# Patient Record
Sex: MALE | Race: OTHER | HISPANIC OR LATINO | URBAN - NONMETROPOLITAN AREA
[De-identification: names, ages, dates, MRNs, and addresses within clinical notes are randomized per-mention and may not be internally consistent; named-entity substitution may affect disease eponyms.]

---

## 2023-07-24 LAB
BASOPHILS (10*3/UL) IN BLOOD BY AUTOMATED COUNT: 0.03 X10E9/L (ref 0–0.1)
BASOPHILS/100 LEUKOCYTES IN BLOOD BY AUTOMATED COUNT: 0.6 % (ref 0–2)
C REACTIVE PROTEIN (MG/L) IN SER/PLAS: <0.1 MG/DL
EOSINOPHILS (10*3/UL) IN BLOOD BY AUTOMATED COUNT: 0.04 X10E9/L (ref 0–0.7)
EOSINOPHILS/100 LEUKOCYTES IN BLOOD BY AUTOMATED COUNT: 0.8 % (ref 0–6)
ERYTHROCYTE DISTRIBUTION WIDTH (RATIO) BY AUTOMATED COUNT: 12.1 % (ref 11.5–14.5)
ERYTHROCYTE MEAN CORPUSCULAR HEMOGLOBIN CONCENTRATION (G/DL) BY AUTOMATED: 33.4 G/DL (ref 32–36)
ERYTHROCYTE MEAN CORPUSCULAR VOLUME (FL) BY AUTOMATED COUNT: 91 FL (ref 80–100)
ERYTHROCYTES (10*6/UL) IN BLOOD BY AUTOMATED COUNT: 4.27 X10E12/L (ref 4.5–5.9)
HEMATOCRIT (%) IN BLOOD BY AUTOMATED COUNT: 38.9 % (ref 41–52)
HEMOGLOBIN (G/DL) IN BLOOD: 13 G/DL (ref 13.5–17.5)
IMMATURE GRANULOCYTES/100 LEUKOCYTES IN BLOOD BY AUTOMATED COUNT: 0.4 % (ref 0–0.9)
LEUKOCYTES (10*3/UL) IN BLOOD BY AUTOMATED COUNT: 5.2 X10E9/L (ref 4.4–11.3)
LYMPHOCYTES (10*3/UL) IN BLOOD BY AUTOMATED COUNT: 2.73 X10E9/L (ref 1.2–4.8)
LYMPHOCYTES/100 LEUKOCYTES IN BLOOD BY AUTOMATED COUNT: 52.3 % (ref 13–44)
MONOCYTES (10*3/UL) IN BLOOD BY AUTOMATED COUNT: 0.48 X10E9/L (ref 0.1–1)
MONOCYTES/100 LEUKOCYTES IN BLOOD BY AUTOMATED COUNT: 9.2 % (ref 2–10)
NEUTROPHILS (10*3/UL) IN BLOOD BY AUTOMATED COUNT: 1.92 X10E9/L (ref 1.2–7.7)
NEUTROPHILS/100 LEUKOCYTES IN BLOOD BY AUTOMATED COUNT: 36.7 % (ref 40–80)
PLATELETS (10*3/UL) IN BLOOD AUTOMATED COUNT: 281 X10E9/L (ref 150–450)
SEDIMENTATION RATE, ERYTHROCYTE: 2 MM/H (ref 0–15)

## 2023-08-30 ENCOUNTER — OFFICE VISIT (OUTPATIENT)
Dept: PRIMARY CARE | Facility: CLINIC | Age: 23
End: 2023-08-30
Payer: COMMERCIAL

## 2023-08-30 VITALS
HEIGHT: 70 IN | HEART RATE: 76 BPM | DIASTOLIC BLOOD PRESSURE: 62 MMHG | BODY MASS INDEX: 16.72 KG/M2 | SYSTOLIC BLOOD PRESSURE: 98 MMHG | WEIGHT: 116.8 LBS

## 2023-08-30 DIAGNOSIS — M87.00 AVN (AVASCULAR NECROSIS OF BONE) (MULTI): Primary | ICD-10-CM

## 2023-08-30 PROCEDURE — 99214 OFFICE O/P EST MOD 30 MIN: CPT | Performed by: FAMILY MEDICINE

## 2023-08-30 PROCEDURE — 1036F TOBACCO NON-USER: CPT | Performed by: FAMILY MEDICINE

## 2023-08-30 NOTE — PROGRESS NOTES
Subjective   Patient ID: Jorge Rainey is a 22 y.o. male who presents for Establishing primary care and also to get pre op clearance for right hip arthroplasty    HPI     Review of Systems    Objective   There were no vitals taken for this visit.    Physical Exam    Assessment/Plan

## 2023-08-30 NOTE — PROGRESS NOTES
Subjective   Jorge Rainey is a 22 y.o. male who presents for No chief complaint on file..  Here to get established - he will be having surgery on his right hip - has AVN.  He has had surgery in the past - femur fracture in 2021 and then surgery to remove hardware.  He has not had any issues with surgery in the past.  He is not on any medications at this time.  Other than his hip he is feeling good, no concerns, no chest pain, no shortness of breath, no nausea/vomiting.      We reviewed his recent labs - he does have a mild anemia, otherwise labs look ok.  On further review of his records it appears that he was in the ER recently with GI symptoms - likely due to NSAID use, he is no longer taking any medications at this time.              Objective   Visit Vitals  BP 98/62 (BP Location: Left arm, Patient Position: Sitting, BP Cuff Size: Adult)   Pulse 76      Physical Exam  Vitals reviewed.   Constitutional:       General: He is not in acute distress.  Cardiovascular:      Rate and Rhythm: Normal rate and regular rhythm.      Heart sounds: No murmur heard.  Pulmonary:      Effort: Pulmonary effort is normal. No respiratory distress.      Breath sounds: Normal breath sounds.   Skin:     General: Skin is warm and dry.   Neurological:      General: No focal deficit present.      Mental Status: He is alert. Mental status is at baseline.         Assessment/Plan   Problem List Items Addressed This Visit    None         Lian Gonzalez MD

## 2023-09-11 PROBLEM — M16.11 PRIMARY OSTEOARTHRITIS OF RIGHT HIP: Status: ACTIVE | Noted: 2023-09-11

## 2023-09-11 PROBLEM — T84.84XA PAINFUL ORTHOPAEDIC HARDWARE (CMS-HCC): Status: ACTIVE | Noted: 2023-09-11

## 2023-09-11 PROBLEM — Z87.81 HISTORY OF FEMUR FRACTURE: Status: ACTIVE | Noted: 2023-09-11

## 2023-09-11 PROBLEM — M62.81 DECREASED MUSCLE STRENGTH: Status: ACTIVE | Noted: 2023-09-11

## 2023-09-11 RX ORDER — NAPROXEN 500 MG/1
500 TABLET ORAL EVERY 12 HOURS
COMMUNITY

## 2023-09-11 RX ORDER — OXYCODONE AND ACETAMINOPHEN 5; 325 MG/1; MG/1
1 TABLET ORAL
COMMUNITY

## 2023-09-11 RX ORDER — IBUPROFEN 800 MG/1
800 TABLET ORAL 3 TIMES DAILY PRN
COMMUNITY

## 2023-09-11 RX ORDER — OXYCODONE AND ACETAMINOPHEN 5; 325 MG/1; MG/1
1 TABLET ORAL EVERY 6 HOURS PRN
COMMUNITY

## 2023-09-27 ENCOUNTER — HOSPITAL ENCOUNTER (OUTPATIENT)
Dept: DATA CONVERSION | Facility: HOSPITAL | Age: 23
Discharge: HOME | End: 2023-09-28
Attending: ORTHOPAEDIC SURGERY | Admitting: ORTHOPAEDIC SURGERY
Payer: COMMERCIAL

## 2023-09-27 DIAGNOSIS — M87.059: ICD-10-CM

## 2023-09-27 DIAGNOSIS — M87.051 IDIOPATHIC ASEPTIC NECROSIS OF RIGHT FEMUR (MULTI): ICD-10-CM

## 2023-09-27 LAB
ANION GAP IN SER/PLAS: 9 MMOL/L (ref 10–20)
CALCIUM (MG/DL) IN SER/PLAS: 9 MG/DL (ref 8.6–10.3)
CARBON DIOXIDE, TOTAL (MMOL/L) IN SER/PLAS: 30 MMOL/L (ref 21–32)
CHLORIDE (MMOL/L) IN SER/PLAS: 107 MMOL/L (ref 98–107)
CREATININE (MG/DL) IN SER/PLAS: 0.73 MG/DL (ref 0.5–1.3)
ERYTHROCYTE DISTRIBUTION WIDTH (RATIO) BY AUTOMATED COUNT: 11.8 % (ref 11.5–14.5)
ERYTHROCYTE MEAN CORPUSCULAR HEMOGLOBIN CONCENTRATION (G/DL) BY AUTOMATED: 34.5 G/DL (ref 32–36)
ERYTHROCYTE MEAN CORPUSCULAR VOLUME (FL) BY AUTOMATED COUNT: 90 FL (ref 80–100)
ERYTHROCYTES (10*6/UL) IN BLOOD BY AUTOMATED COUNT: 3.75 X10E12/L (ref 4.5–5.9)
GFR MALE: >90 ML/MIN/1.73M2
GLUCOSE (MG/DL) IN SER/PLAS: 81 MG/DL (ref 74–99)
HEMATOCRIT (%) IN BLOOD BY AUTOMATED COUNT: 33.9 % (ref 41–52)
HEMOGLOBIN (G/DL) IN BLOOD: 11.7 G/DL (ref 13.5–17.5)
LEUKOCYTES (10*3/UL) IN BLOOD BY AUTOMATED COUNT: 9 X10E9/L (ref 4.4–11.3)
PLATELETS (10*3/UL) IN BLOOD AUTOMATED COUNT: 219 X10E9/L (ref 150–450)
POTASSIUM (MMOL/L) IN SER/PLAS: 4.6 MMOL/L (ref 3.5–5.3)
SODIUM (MMOL/L) IN SER/PLAS: 141 MMOL/L (ref 136–145)
UREA NITROGEN (MG/DL) IN SER/PLAS: 8 MG/DL (ref 6–23)

## 2023-09-28 VITALS — BODY MASS INDEX: 16.03 KG/M2 | HEIGHT: 70 IN | WEIGHT: 111.99 LBS

## 2023-09-28 LAB
ANION GAP IN SER/PLAS: 8 MMOL/L (ref 10–20)
BASOPHILS (10*3/UL) IN BLOOD BY AUTOMATED COUNT: 0.04 X10E9/L (ref 0–0.1)
BASOPHILS/100 LEUKOCYTES IN BLOOD BY AUTOMATED COUNT: 0.4 % (ref 0–2)
CALCIUM (MG/DL) IN SER/PLAS: 8.4 MG/DL (ref 8.6–10.3)
CARBON DIOXIDE, TOTAL (MMOL/L) IN SER/PLAS: 29 MMOL/L (ref 21–32)
CHLORIDE (MMOL/L) IN SER/PLAS: 103 MMOL/L (ref 98–107)
CREATININE (MG/DL) IN SER/PLAS: 0.88 MG/DL (ref 0.5–1.3)
EOSINOPHILS (10*3/UL) IN BLOOD BY AUTOMATED COUNT: 0.01 X10E9/L (ref 0–0.7)
EOSINOPHILS/100 LEUKOCYTES IN BLOOD BY AUTOMATED COUNT: 0.1 % (ref 0–6)
ERYTHROCYTE DISTRIBUTION WIDTH (RATIO) BY AUTOMATED COUNT: 11.7 % (ref 11.5–14.5)
ERYTHROCYTE MEAN CORPUSCULAR HEMOGLOBIN CONCENTRATION (G/DL) BY AUTOMATED: 34.7 G/DL (ref 32–36)
ERYTHROCYTE MEAN CORPUSCULAR VOLUME (FL) BY AUTOMATED COUNT: 88 FL (ref 80–100)
ERYTHROCYTES (10*6/UL) IN BLOOD BY AUTOMATED COUNT: 2.78 X10E12/L (ref 4.5–5.9)
GFR MALE: >90 ML/MIN/1.73M2
GLUCOSE (MG/DL) IN SER/PLAS: 133 MG/DL (ref 74–99)
HEMATOCRIT (%) IN BLOOD BY AUTOMATED COUNT: 24.5 % (ref 41–52)
HEMOGLOBIN (G/DL) IN BLOOD: 8.5 G/DL (ref 13.5–17.5)
IMMATURE GRANULOCYTES/100 LEUKOCYTES IN BLOOD BY AUTOMATED COUNT: 0.4 % (ref 0–0.9)
LEUKOCYTES (10*3/UL) IN BLOOD BY AUTOMATED COUNT: 10.8 X10E9/L (ref 4.4–11.3)
LYMPHOCYTES (10*3/UL) IN BLOOD BY AUTOMATED COUNT: 1.67 X10E9/L (ref 1.2–4.8)
LYMPHOCYTES/100 LEUKOCYTES IN BLOOD BY AUTOMATED COUNT: 15.5 % (ref 13–44)
MONOCYTES (10*3/UL) IN BLOOD BY AUTOMATED COUNT: 1.03 X10E9/L (ref 0.1–1)
MONOCYTES/100 LEUKOCYTES IN BLOOD BY AUTOMATED COUNT: 9.6 % (ref 2–10)
NEUTROPHILS (10*3/UL) IN BLOOD BY AUTOMATED COUNT: 7.96 X10E9/L (ref 1.2–7.7)
NEUTROPHILS/100 LEUKOCYTES IN BLOOD BY AUTOMATED COUNT: 74 % (ref 40–80)
PLATELETS (10*3/UL) IN BLOOD AUTOMATED COUNT: 179 X10E9/L (ref 150–450)
POTASSIUM (MMOL/L) IN SER/PLAS: 4.1 MMOL/L (ref 3.5–5.3)
SODIUM (MMOL/L) IN SER/PLAS: 136 MMOL/L (ref 136–145)
UREA NITROGEN (MG/DL) IN SER/PLAS: 12 MG/DL (ref 6–23)

## 2023-09-29 LAB
BASOPHILS (10*3/UL) IN BLOOD BY AUTOMATED COUNT: NORMAL
BASOPHILS/100 LEUKOCYTES IN BLOOD BY AUTOMATED COUNT: NORMAL
EOSINOPHILS (10*3/UL) IN BLOOD BY AUTOMATED COUNT: NORMAL
EOSINOPHILS/100 LEUKOCYTES IN BLOOD BY AUTOMATED COUNT: NORMAL
ERYTHROCYTE DISTRIBUTION WIDTH (RATIO) BY AUTOMATED COUNT: NORMAL
ERYTHROCYTE MEAN CORPUSCULAR HEMOGLOBIN CONCENTRATION (G/DL) BY AUTOMATED: NORMAL
ERYTHROCYTE MEAN CORPUSCULAR VOLUME (FL) BY AUTOMATED COUNT: NORMAL
ERYTHROCYTES (10*6/UL) IN BLOOD BY AUTOMATED COUNT: NORMAL
HEMATOCRIT (%) IN BLOOD BY AUTOMATED COUNT: NORMAL
HEMOGLOBIN (G/DL) IN BLOOD: NORMAL
IMMATURE GRANULOCYTES/100 LEUKOCYTES IN BLOOD BY AUTOMATED COUNT: NORMAL
LEUKOCYTES (10*3/UL) IN BLOOD BY AUTOMATED COUNT: NORMAL
LYMPHOCYTES (10*3/UL) IN BLOOD BY AUTOMATED COUNT: NORMAL
LYMPHOCYTES/100 LEUKOCYTES IN BLOOD BY AUTOMATED COUNT: NORMAL
MANUAL DIFFERENTIAL Y/N: NORMAL
MONOCYTES (10*3/UL) IN BLOOD BY AUTOMATED COUNT: NORMAL
MONOCYTES/100 LEUKOCYTES IN BLOOD BY AUTOMATED COUNT: NORMAL
NEUTROPHILS (10*3/UL) IN BLOOD BY AUTOMATED COUNT: NORMAL
NEUTROPHILS/100 LEUKOCYTES IN BLOOD BY AUTOMATED COUNT: NORMAL
NRBC (PER 100 WBCS) BY AUTOMATED COUNT: NORMAL
PLATELETS (10*3/UL) IN BLOOD AUTOMATED COUNT: NORMAL

## 2023-09-30 NOTE — PROGRESS NOTES
Service: Orthopaedics     Subjective Data:   EVELINE PURCELL is a 22 year old Male who is Hospital Day # 2 and POD #1 for 1. RIGHT TOTAL HIP REPLACEMENT;    Overnight Events: Patient had an uneventful night.     Objective Data:     Objective Information:      T   P  R  BP   MAP  SpO2   Value  36.9  90  16  106/54      98%  Date/Time 9/28 2:27 9/28 2:27 9/27 19:27 9/28 2:27 9/28 2:27  Range  (36.4C - 36.9C )  (68 - 90 )  (16 - 16 )  (102 - 106 )/ (54 - 56 )    (98% - 100% )  Highest temp of 36.9 C was recorded at 9/28 2:27      Pain reported at 9/27 22:15: 2 = Mild      T   P  R  BP   MAP  SpO2   Value  36.9  90  16  106/54      98%  Date/Time 9/28 2:27 9/28 2:27 9/27 19:27 9/28 2:27 9/28 2:27  Range  (36.4C - 36.9C )  (68 - 90 )  (16 - 16 )  (102 - 106 )/ (54 - 56 )    (98% - 100% )  Highest temp of 36.9 C was recorded at 9/28 2:27        Pain reported at 9/27 22:15: 2 = Mild    Physical Exam by System:    Eyes: PERRL, EOMI, clear sclera   ENMT: mucous membranes moist, no apparent injury,  no lesions seen   Head/Neck: Neck supple, no apparent injury, thyroid  without mass or tenderness, No JVD, trachea midline, no bruits   Respiratory/Thorax: Patent airways, CTAB, normal  breath sounds with good chest expansion, thorax symmetric   Cardiovascular: Regular, rate and rhythm, no murmurs,  2+ equal pulses of the extremities, normal S 1and S 2   Gastrointestinal: Nondistended, soft, non-tender,  no rebound tenderness or guarding, no masses palpable, no organomegaly, +BS, no bruits   Extremities: Right hip dressings clean and dry  Good sensation cap refill  2+ pulses   Neurological: alert and oriented x3, intact senses,  motor, response and reflexes, normal strength   Skin: Warm and dry, no lesions, no rashes     Medication:    Medications:          Continuous Medications       --------------------------------    1. Lactated Ringers Infusion:  1000  mL  IntraVenous  <Continuous>    2. Lactated Ringers  Infusion:  1000  mL  IntraVenous  <Continuous>         Scheduled Medications       --------------------------------    1. Acetaminophen:  650  mg  Oral  Every 6 Hours    2. ceFAZolin 2 gram/ D5W 100 mL Premix IVPB:  100  mL  IntraVenous Piggyback  Every 6 Hours    3. Docusate:  100  mg  Oral  2 Times a Day    4. Influenza Virus QUADRIVALENT (Inactive) ADULT Vaccine:  0.5  mL  IntraMuscular  Once    5. Polyethylene Glycol:  17  gram(s)  Oral  2 Times a Day    6. Rivaroxaban:  10  mg  Oral  Daily         PRN Medications       --------------------------------    1. Cyclobenzaprine:  10  mg  Oral  3 Times a Day    2. HYDROmorphone Injectable:  0.4  mg  IntraVenous Push  Every 2 Hours    3. Ketorolac Injectable:  15  mg  IntraVenous Push  Every 6 Hours    4. Magnesium Hydroxide -Al Hydrox -Simethicone Oral Liquid:  30  mL  Oral  Every 6 Hours    5. Ondansetron Injectable:  4  mg  IntraVenous Push  Every 6 Hours    6. oxyCODONE Immediate Release:  5  mg  Oral  Every 4 Hours    7. oxyCODONE Immediate Release:  10  mg  Oral  Every 4 Hours    8. traMADol:  50  mg  Oral  Every 6 Hours        Recent Lab Results:    Results:    CBC: 9/28/2023 05:39              \     Hgb     /                              \     8.5 L    /  WBC  ----------------  Plt               10.8       ----------------    179              /     Hct     \                              /     24.5 L    \            RBC: 2.78 L    MCV: 88     Neutrophil %: 74.0      BMP: 9/27/2023 17:03  NA+        Cl-     BUN  /                         141    107    8  /  --------------------------------  Glucose                ---------------------------  81    K+     HCO3-   Creat \                         4.6  30    0.73  \  Calcium : 9.0     Anion Gap : 9 L        I have reviewed these laboratory results:    Complete Blood Count + Differential  28-Sep-2023 05:39:00      Result Value    White Blood Cell Count  10.8    Red Blood Cell Count  2.78   L   HGB  8.5   L    HCT  24.5   L   MCV  88    MCHC  34.7    PLT  179    RDW-CV  11.7    Neutrophil %  74.0    Immature Granulocytes %  0.4    Lymphocyte %  15.5    Monocyte %  9.6    Eosinophil %  0.1    Basophil %  0.4    Neutrophil Count  7.96   H   Lymphocyte Count  1.67    Monocyte Count  1.03   H   Eosinophil Count  0.01    Basophil Count  0.04        Radiology Results:    Results:        Impression:    Satisfactory appearance status post right total hip arthroplasty.     Xray Pelvis 1 or 2 View [Sep 27 2023  4:09PM]      Assessment and Plan:   Code Status:  ·  Code Status Full Code     Assessment:    Subjective: No acute events overnight. Pain controlled. Denies chest pain/shortness of breath. Patient's resting quietly I spoke to him at length he does want  to go home with home physical therapy.    Objective: Vital signs stable.  Right hip dressings clean and dry    vitals reviewed    No acute distress, breathing comfortably  Operative extremity: Dressing clean/dry/intact. Motor and sensory intact distally. Calves soft and non-tender. Toes warm and perfused.    Impression: s/p hip surgeryPostop day 1 right total hip replacement  1. Pain control  2. continue rx  3. PT/OTWeightbearing as tolerated with anterior hip precautions  4. DVT prophylaxisXarelto 10 mg daily  5. Discharge planning with home physical therapy      Electronic Signatures:  Marck Stewart (APRN-CNP)   (Signed 28-Sep-2023 07:48)   Authored: Service, Subjective Data, Objective Data, Assessment and Plan, Note Completion  Tomas Maher)   (Signed 28-Sep-2023 11:34)   Co-Signer: Service, Subjective Data, Objective Data, Assessment and Plan, Note Completion    Last Updated: 28-Sep-2023 11:34 by Tomas Maher)

## 2023-09-30 NOTE — DISCHARGE SUMMARY
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 RIGHT TOTAL HIP REPLACEMENT   Condition at Discharge: Satisfactory   Disposition at Discharge: Home Health Care - New   Vital Signs:        T   P  R  BP   MAP  SpO2   Value  37.4  75  16  121/56      100%  Date/Time 9/28 8:09 9/28 8:09 9/28 8:09 9/28 8:09    9/28 8:09  Range  (36.4C - 37.4C )  (68 - 90 )  (16 - 16 )  (102 - 121 )/ (54 - 56 )    (98% - 100% )  Highest temp of 37.4 C was recorded at 9/28 8:09    Date:            Weight/Scale Type:  Height:   27-Sep-2023 13:05  50.8  kg / standing       Hospital Course:    22-year-old male came to the office complaining of right hip pain.  After discussion risk versus benefits brought in for right total hip replacement.  Tolerated  the procedure well.  Plan to discharge home with home physical therapy    Follow up with Dr Layne in 2 weeks for wound check  weight bearing as tolerated. anterior hip precautions  May shower allowing water to run over incision and pat dry. DO NOT wash or scrub incision  May shower with Aquacel over incision  Remove Aquacel 10/4/23  Incentive Spirometry 10x every hour while awake x2 weeks  Surgical hose x3 weeks removing only for skin care and hygiene  Ice to right hip 20 minutes every hour  If incision begins to drain call office right away    Immunizations:    Immunizations:  28-Sep-2023   .Influenza- Influenza Virus: Immunizations,  28-Sep-2023      Discharge Information:    and Continuing Care:   Lab Results - Pending:    None  Radiology Results - Pending: None   Discharge Instructions:    Activity:           activity as tolerated.          May shower..            May not return to school/work until follow-up visit with.            May not drive until follow-up visit.            No pushing, pulling, or lifting objects greater than 5 pounds.            Weight-bearing Instructions: weight-bearing as tolerated right leg.            May shower allowing water to run over incision and pat dry. DO NOT wash or scrub incision    Nutrition/Diet:           resume normal diet    Wound Care:           Wound Site:   Right hip          Wound Type:   surgical incision          Change Dressing:   Remove Aquacel like a Band-Aid on 10/4/2023          Instructions:   no lotions, creams, or tub soaks    Additional Orders:           Additional Instructions:   Follow up with Dr Layne in 2 weeks for wound check  weight bearing as tolerated. anterior hip precautions  May shower allowing water to run over incision and pat dry. DO NOT wash or scrub incision  May shower with Aquacel over incision  Remove Aquacel 10/4/23  Incentive Spirometry 10x every hour while awake x2 weeks  Surgical hose x3 weeks removing only for skin care and hygiene  Ice to right hip 20 minutes every hour  If incision begins to drain call office right away        Home Care Certification:           Home Care Agency:    [facility] [phone]          Skilled Disciplines Ordered:   PT,  OT    Home Care Services:           Home Care Skilled Service:   Rehab (PT/OT/SP eval and treat)    Follow Up Appointments:    Follow-Up Appointment 01:           Physician/Dept/Service:   Dr. LUCRECIA GROSSMAN as scheduled          Reason for Referral:   right hip          Call to Schedule in:   2 weeks          Location:   [address] [facility]          Phone Number:   [phone]    Discharge  Medications: Home Medication   rivaroxaban 10 mg oral tablet - 1 tab(s) orally once a day x 30 days      PRN Medication   docusate sodium 100 mg oral capsule - 1 cap(s) orally 2 times a day x 30 days, As Needed for constipation  cyclobenzaprine 10 mg oral tablet - 1 tab(s) orally every 8 hours x 14 days, As Needed   oxyCODONE 5 mg oral tablet - 1 tab(s) orally every 4 hours x 7 days, As Needed   ondansetron 4 mg oral tablet, disintegrating - 1 tab(s) orally every 8 hours x 7 days, As Needed   acetaminophen 325 mg oral tablet - 2 tab(s) orally every 6 hours, As Needed     DNR Status:   ·  Code Status Code Status order at time of discharge: Full Code       Electronic Signatures:  Marck Stewart ([facility]-CNP)  (Signed 28-Sep-2023 10:28)   Authored: Send Summary, Summary Content, Immunizations,  Ongoing Care, DNR Status, Note Completion      Last Updated: 28-Sep-2023 10:28 by Marck Stewart ([facility]-CNP)

## 2023-10-01 NOTE — OP NOTE
PROCEDURE DETAILS    Preoperative Diagnosis:  Avascular necrosis right hip  Postoperative Diagnosis:  Avascular necrosis right hip  Surgeon: Brennen Layne  Resident/Fellow/Other Assistant: Isaac Mooney    Procedure:  1. RIGHT TOTAL HIP REPLACEMENT    Anesthesia: Spinal  Estimated Blood Loss: 100 cc  Findings: Collapse of the femoral head  Specimens(s) Collected: no,     Complications: None        Operative Report:   Indications: The patient has avascular necrosis of the right hip. The patient wishes to proceed with total hip arthroplasty. The procedure was explained along  with the risks, benefits alternatives being reviewed. Potential risks including but not limited to infection, neurovascular complication, instability, loosening, wear, limb length inequality, DVT along with the potential need for reoperation and/or revision  surgery were all discussed with the patient and they consented to the procedure.    Findings:  Femoral head collapse right hip    Components: Depuy [place], [place] Cup    Femoral stem size: 13 standard  Neck length: +5  Acetabular cup size: 56 mm  Acetabular liner size inner diameter: 36 mm neutral  Femoral head size: 36 mm    Procedure:    The patient was brought to the operative suite. A spinal anesthetic was administered per anesthesia. The patient was then placed in the lateral decubitus position, right side up. An axillary roll was placed. Pegs were placed in the pegboard and padded.  The down leg was well-padded at the knee and ankle. A U drape was used to exclude the operative hip and lower extremity from the rest of the body. The hip and lower extremity was then sterilely prepped with ChloraPrep then sterilely draped in the usual  manner. Timeout was performed, the patient was then identified, the site, laterality and procedure confirmed along with the administration of the antibiotics.    I began by identifying landmarks. I made a lateral incision centered over the greater  trochanter for a direct lateral approach to the hip. Dissection was carried down carefully through skin and subcutaneous tissues. Some of the vessels in the fatty tissue  were cauterized. Identified the tensor fascia and IT band. I incised this in the line of the incision. A Charnley retractor was then placed. I then identified the gluteus medius. About an inch from the posterior border of the gluteus medius I split the  medius musculature and identified the gluteus minimus tendon. I incised through the minimus tendon. I split the minimus musculature and identified the capsule. I then incised through the capsule. Next I incised through the vastus lateralis tendon. I split  the vastus musculature down to the lateral femur. The crossing vessels were cauterized. I then raised as a cuff,the soft tissues anteriorly off of the trochanter and neck for the direct lateral approach to the hip. The hip was then dislocated anteriorly.  I then measured from the lesser trochanter based on templating and marked the length of my neck cut. I used a broach to christianne the angle of the neck cut. I then made the neck cut with a saw and completed it with an osteotome and mallet. I began preparing  the femur. First using a box osteotome then a T-handle canal finding reamer I began then broaching. I broached sequentially to the appropriate size. A calcar planar was used to smooth off the neck.    I turned my attention then to the acetabulum. Retractors were placed around the acetabulum. I debrided the labrum. I then began reaming sequentially up to the appropriate size. I thoroughly and copiously irrigated with normal saline. I then impacted the  cup using the outrigger and approximately 45 degrees of horizontal abduction and 20 degrees of anteversion. Once the cup was impacted into place I checked it with a [place] to be sure it was stable. Next I drilled and placed 2 screws for supplemental fixation.  An apex hole  was placed. I then  impacted the polyethylene liner, I checked this with a [place] as well to be sure it was seated. I then debrided any osteophytes from around the acetabular rim with an osteotome and mallet.    I began trialing then off the femoral broach. Once I was able to achieve a stable construct I redislocated the hip. The broach was removed. I then impacted the stem matching the anteversion that I had broached to. I then cleaned off the fel[place] taper. The  head was then impacted onto the stem. I check to be sure the head was seated. The hip was reduced. I again took the hip through range of motion and was satisfied with my stability. I then infiltrated the soft tissues with a mixture of ropivacaine with  epinephrine, clonidine and Toradol.    At that point I thoroughly copiously irrigated with normal saline. Attention was turned to closure. The capsule was repaired with #1 Ethibond. The gluteus minimus was repaired with #1 Vicryl. I then drilled and placed the Biomet juggernaut suture anchor  with #2 max braid suture into the trochanter. I passed the sutures through the cuff of anterior tissues and tied this bringing this down to the greater trochanter. I repaired the split in the medius musculature with #1 Vicryl. I repaired the vastus lateralis  with #1 Vicryl. I then repaired the IT band with Ethibond. The deep fatty tissue was repaired with 0 Vicryl. Subcutaneous tissue closure was with 2-0 Vicryl. The skin was closed with Monocryl and Dermabond. A sterile waterproof dressing was applied.    The patient procedure well. There were no apparent complications. The patient was transferred from the operating room to the recovery room and was in stable condition.    The physician assistant was present for the entire case.  Given the nature of the procedure and disease process a skilled surgical assistant was  necessary for the case.  The assistant was necessary for retraction and helped directly facilitate completion of the surgery.   A certified scrub tech was at the back table managing instruments and supplies for the surgical procedure.                        Attestation:   Note Completion:  Attending Attestation I performed the procedure without a resident         Electronic Signatures:  Brennen Layne)  (Signed 27-Sep-2023 15:17)   Authored: Post-Operative Note, Chart Review, Note Completion      Last Updated: 27-Sep-2023 15:17 by Brennen Layne)

## 2023-10-13 ENCOUNTER — APPOINTMENT (OUTPATIENT)
Dept: ORTHOPEDIC SURGERY | Facility: CLINIC | Age: 23
End: 2023-10-13
Payer: COMMERCIAL

## 2023-10-19 ENCOUNTER — ANCILLARY PROCEDURE (OUTPATIENT)
Dept: RADIOLOGY | Facility: CLINIC | Age: 23
End: 2023-10-19
Payer: COMMERCIAL

## 2023-10-19 ENCOUNTER — OFFICE VISIT (OUTPATIENT)
Dept: ORTHOPEDIC SURGERY | Facility: CLINIC | Age: 23
End: 2023-10-19
Payer: COMMERCIAL

## 2023-10-19 DIAGNOSIS — M25.551 HIP PAIN, ACUTE, RIGHT: ICD-10-CM

## 2023-10-19 DIAGNOSIS — M25.551 HIP PAIN, ACUTE, RIGHT: Primary | ICD-10-CM

## 2023-10-19 PROCEDURE — 73502 X-RAY EXAM HIP UNI 2-3 VIEWS: CPT | Mod: RT

## 2023-10-19 PROCEDURE — 1036F TOBACCO NON-USER: CPT | Performed by: ORTHOPAEDIC SURGERY

## 2023-10-19 PROCEDURE — 99024 POSTOP FOLLOW-UP VISIT: CPT | Performed by: ORTHOPAEDIC SURGERY

## 2023-10-19 PROCEDURE — 73502 X-RAY EXAM HIP UNI 2-3 VIEWS: CPT | Mod: RIGHT SIDE | Performed by: ORTHOPAEDIC SURGERY

## 2023-10-19 NOTE — PROGRESS NOTES
Chief Complaint   Patient presents with    Left Hip - Post-op       The patient is here for follow-up of their side: right hip arthroplasty.  The patient has no hip pain.  The patient has no mechanical symptoms.  The patient is approximately 3 weeks postop.    Physical examination:    Examination of the side: right hip  The incision is healing well  No erythema or warmth.  Range of motion: Forward Flexion: 120 Internal Rotation: 20 External Rotation: 40 Abduction 40  The Patient can single leg stand and actively abduct  Calf is soft, Homans negative  The patient has intact ankle dorsiflexion and plantarflexion.    Radiographs:  XR hip right 2 or 3 views  Interpreted By:  Brennen Layne,   STUDY:  XR HIP RIGHT 2 OR 3 VIEWS;  ;  10/19/2023 10:19 am      INDICATION:  Signs/Symptoms:pain.      ACCESSION NUMBER(S):  [id number]      ORDERING CLINICIAN:  BRENNEN LAYNE      FINDINGS:  Right hip films show a total hip arthroplasty in satisfactory  position. The hip is reduced. No fracture is identified. No obvious  loosening or wear is appreciated.          Signed by: Brennen Layne 10/19/2023 10:40 AM  Dictation workstation:   [id number]        Impression:  Status post side: right total hip arthroplasty    Plan:  Outpatient physical therapy  Follow up in  9 weeks  All questions answered

## 2023-12-18 DIAGNOSIS — Z96.641 S/P TOTAL RIGHT HIP ARTHROPLASTY: Primary | ICD-10-CM

## 2023-12-21 ENCOUNTER — APPOINTMENT (OUTPATIENT)
Dept: ORTHOPEDIC SURGERY | Facility: CLINIC | Age: 23
End: 2023-12-21

## 2023-12-21 NOTE — PROGRESS NOTES
No chief complaint on file.      The patient is here for follow-up of their {side:00146} hip arthroplasty.  The patient has {severity:74706} hip pain.  The patient has {severity:02406} mechanical symptoms.  The patient is approximately {Time; 1 week to 1 year:71528} postop.    Physical examination:    Examination of the {side:70446} hip  The incision is {Incision status:85750}  No erythema or warmth.  Range of motion: Forward Flexion: {egrees of flexion to 180:11599} Internal Rotation: {degrees of flexion to 90:41360} External Rotation: {degrees of flexion to 90:77020} Abduction {degrees of flexion to 90:35593}  The Patient {can/cannot:56940} single leg stand and actively abduct  Calf is soft, Homans negative  The patient has intact ankle dorsiflexion and plantarflexion.    Radiographs:  XR hip right 2 or 3 views  Interpreted By:  Brennen Layne,   STUDY:  XR HIP RIGHT 2 OR 3 VIEWS;  ;  10/19/2023 10:19 am      INDICATION:  Signs/Symptoms:pain.      ACCESSION NUMBER(S):  [id number]      ORDERING CLINICIAN:  BRENNEN LAYNE      FINDINGS:  Right hip films show a total hip arthroplasty in satisfactory  position. The hip is reduced. No fracture is identified. No obvious  loosening or wear is appreciated.          Signed by: Brennen Layne 10/19/2023 10:40 AM  Dictation workstation:   [id number]        Impression:  Status post {side:04586} total hip arthroplasty    Plan:  Discussed the continued importance of prophylactic dental antibiotics  Outpatient physical therapy  Follow up in {Time; 1 week to 1 year:12003}  All questions answered
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
